# Patient Record
Sex: FEMALE | Race: WHITE | Employment: OTHER | URBAN - METROPOLITAN AREA
[De-identification: names, ages, dates, MRNs, and addresses within clinical notes are randomized per-mention and may not be internally consistent; named-entity substitution may affect disease eponyms.]

---

## 2017-01-05 ENCOUNTER — APPOINTMENT (OUTPATIENT)
Dept: PHYSICAL THERAPY | Facility: HOSPITAL | Age: 67
End: 2017-01-05
Attending: UROLOGY
Payer: COMMERCIAL

## 2017-01-12 ENCOUNTER — OFFICE VISIT (OUTPATIENT)
Dept: PHYSICAL THERAPY | Facility: HOSPITAL | Age: 67
End: 2017-01-12
Attending: UROLOGY
Payer: COMMERCIAL

## 2017-01-12 PROCEDURE — 97140 MANUAL THERAPY 1/> REGIONS: CPT

## 2017-01-12 PROCEDURE — 97112 NEUROMUSCULAR REEDUCATION: CPT

## 2017-01-12 NOTE — PROGRESS NOTES
Dx: Stress incontinence in female (N39.3) Authorized # of Visits: Baron Gill GOMEZ       Next MD visit: none scheduled  Urge incontinence (N39.41)  Overactive bladder (N32.81)           Fall Risk: standard         Precautions: n/a           Medication Changes sinc massage, cross friction, trigger point release, and ischemic release techniques to reduce pain, improve mobility, increase circulation/blood flow, and promote removal of inflammation. Patient reported no pain with palpation at end of session.  Progressed pe

## 2017-01-17 ENCOUNTER — OFFICE VISIT (OUTPATIENT)
Dept: OBGYN CLINIC | Facility: CLINIC | Age: 67
End: 2017-01-17

## 2017-01-17 VITALS
BODY MASS INDEX: 21.89 KG/M2 | HEART RATE: 86 BPM | SYSTOLIC BLOOD PRESSURE: 130 MMHG | WEIGHT: 133 LBS | HEIGHT: 65.5 IN | DIASTOLIC BLOOD PRESSURE: 81 MMHG

## 2017-01-17 DIAGNOSIS — Z01.419 ENCOUNTER FOR GYNECOLOGICAL EXAMINATION WITHOUT ABNORMAL FINDING: Primary | ICD-10-CM

## 2017-01-17 DIAGNOSIS — N94.10 DYSPAREUNIA IN FEMALE: ICD-10-CM

## 2017-01-17 PROCEDURE — 99386 PREV VISIT NEW AGE 40-64: CPT | Performed by: OBSTETRICS & GYNECOLOGY

## 2017-01-17 NOTE — PROGRESS NOTES
Mauricio iVdal is a 61year old female  No LMP recorded. Patient is postmenopausal. Patient presents with:  Gyn Exam: annual -- new patient. severe dyspareunia; going to PT for urine incontinence & they are helping w/ that also.   .    OBSTETRICS HIST • Heart Disease Neg      No Family h/o CAD;  No Family h/o Premature CAD   • Diabetes Neg      No Family h/o Diabetes   • Heart Disorder Neg    • Hypertension Neg    • Lipids Neg    • Cancer Other 28     niece -- cervical cancer       MEDICATIONS:    Curr asymmetry, nipple discharge, nipple retraction or skin changes  Abdomen:  soft, nontender, nondistended, no masses  Skin/Hair: no unusual rashes or bruises  Extremities: no edema, no cyanosis  Psychiatric:  Oriented to time, place, person and situation.  Ap

## 2017-01-19 ENCOUNTER — OFFICE VISIT (OUTPATIENT)
Dept: PHYSICAL THERAPY | Facility: HOSPITAL | Age: 67
End: 2017-01-19
Attending: UROLOGY
Payer: COMMERCIAL

## 2017-01-19 PROCEDURE — 97140 MANUAL THERAPY 1/> REGIONS: CPT

## 2017-01-19 NOTE — PROGRESS NOTES
Patient Name: Syd Ross  YOB: 1950          MRN number:  [id number]  Date:  1/19/17  Referring Physician:  Ladonna Telles  Dx: Stress incontinence in female (N39.3)  Urge incontinence (N39.41)  Overactive bladder (N32.81)    Physic coordination/muscle recruitment with quick contractions. Patient has met all functional goals and has been instructed to continue independently at home. Patient was instructed in and administered updated HEP.      Objective:    GAIT ANALYSIS  WNL    POSTURE supine  12/15: PF activation with walking   12/22: SB  Pelvic floor  1/12: PF L4a  1/19: L5   Therapeutic Exercise    Manual/Internal ROM/strength/function re-assessment    Internal:  STM, MFR, CT stretching, trigger point release: L sup transverse, deep t therapist: Carlyn Man, PT    [de-identified] certification required: Yes  I certify the need for these services furnished under this plan of treatment and while under my care.     X___________________________________________________ Date____________________

## 2017-01-23 ENCOUNTER — OFFICE VISIT (OUTPATIENT)
Dept: DERMATOLOGY CLINIC | Facility: CLINIC | Age: 67
End: 2017-01-23

## 2017-01-23 DIAGNOSIS — D23.60 BENIGN NEOPLASM OF SKIN OF UPPER LIMB, INCLUDING SHOULDER, UNSPECIFIED LATERALITY: ICD-10-CM

## 2017-01-23 DIAGNOSIS — L81.4 LENTIGO: ICD-10-CM

## 2017-01-23 DIAGNOSIS — D23.30 BENIGN NEOPLASM OF SKIN OF FACE: ICD-10-CM

## 2017-01-23 DIAGNOSIS — L56.5 DSAP (DISSEMINATED SUPERFICIAL ACTINIC POROKERATOSIS): ICD-10-CM

## 2017-01-23 DIAGNOSIS — L57.0 LICHENOID ACTINIC KERATOSIS: Primary | ICD-10-CM

## 2017-01-23 DIAGNOSIS — D23.4 BENIGN NEOPLASM OF SCALP AND SKIN OF NECK: ICD-10-CM

## 2017-01-23 DIAGNOSIS — D23.5 BENIGN NEOPLASM OF SKIN OF TRUNK, EXCEPT SCROTUM: ICD-10-CM

## 2017-01-23 PROCEDURE — 17000 DESTRUCT PREMALG LESION: CPT | Performed by: DERMATOLOGY

## 2017-01-23 PROCEDURE — 99203 OFFICE O/P NEW LOW 30 MIN: CPT | Performed by: DERMATOLOGY

## 2017-01-25 ENCOUNTER — TELEPHONE (OUTPATIENT)
Dept: GASTROENTEROLOGY | Facility: CLINIC | Age: 67
End: 2017-01-25

## 2017-01-25 NOTE — TELEPHONE ENCOUNTER
Post colonoscopy ( [facility]) : normal colonoscopy. Moderate tortuosity. Internal hemorrhoids.  GI RN recall colonoscopy for 10 years

## 2017-01-26 ENCOUNTER — APPOINTMENT (OUTPATIENT)
Dept: PHYSICAL THERAPY | Facility: HOSPITAL | Age: 67
End: 2017-01-26
Attending: UROLOGY
Payer: COMMERCIAL

## 2017-02-07 NOTE — PROGRESS NOTES
Dillon Mensah is a 61year old female. HPI:     CC:  Patient presents with:  Derm Problem: LOV: 2/2/2011. Patient presenting with sun damage to body due to living in [place] during the winter and requesting skin check. Patient has family Hx of BCC.   Chandler Lindquist Rfl: 3     Allergies:     Duloxetine Hcl              Comment:Other reaction(s): night sweats, elevated BP    Past Medical History   Diagnosis Date   • Osteoporosis screening 8/21/2013     per NG   • Mood swings (HCC)    • Urinary incontinence      using [facility]. History, medications, allergies reviewed as noted. ROS:  Denies any other systemic complaints. No new or changeing lesions other than noted above. No fevers, chills, night sweats, unusual sun sensitivity. No other skin complaints.         Hi face  Benign neoplasm of scalp and skin of neck    Lichenoid keratosis. Actinic keratoses. Precancerous nature discussed. Lesions treated with cryo- . Biopsy if not resolved. Lesion right medial posterior shoulder probable seborrheic keratosis.   Angie Alvarez

## 2017-03-14 ENCOUNTER — TELEPHONE (OUTPATIENT)
Dept: SURGERY | Facility: CLINIC | Age: 67
End: 2017-03-14

## 2017-03-14 NOTE — TELEPHONE ENCOUNTER
I spoke with pt and informed her that I show a Vesicare 5 mg script on file in her med module and a 1 yr supply was sent to her [facility] in [place] back in 6/2016.  I told her that we cannot send scripts out of state but that I can ask [facility] to refill and w

## 2017-03-14 NOTE — TELEPHONE ENCOUNTER
Pt states [facility] suggested for pt to use vesicare 10mg on 12/28 OV, pt refused, but now would like to use vesicare 10mg tablets, pt [facility] [place] phone number: [phone]. Pls advise thank you.

## 2017-03-15 RX ORDER — SOLIFENACIN SUCCINATE 10 MG/1
10 TABLET, FILM COATED ORAL DAILY
Qty: 30 TABLET | Refills: 11 | Status: SHIPPED | OUTPATIENT
Start: 2017-03-15 | End: 2017-03-20

## 2017-03-16 NOTE — TELEPHONE ENCOUNTER
I signed order for Vesicare 10 mg, and sent it electronically.   Many many thanks, Dr. Lizeth Echeverria

## 2017-03-16 NOTE — TELEPHONE ENCOUNTER
Spoke with patient informed that Dr Saumya Rashid approved Vesicare 10mg and gave refills for a year. States [facility] [place] and have them transfter script from [facility].

## 2017-03-17 ENCOUNTER — TELEPHONE (OUTPATIENT)
Dept: FAMILY MEDICINE CLINIC | Facility: CLINIC | Age: 67
End: 2017-03-17

## 2017-03-17 DIAGNOSIS — F32.A DEPRESSIVE DISORDER: ICD-10-CM

## 2017-03-17 DIAGNOSIS — K21.9 GASTROESOPHAGEAL REFLUX DISEASE, ESOPHAGITIS PRESENCE NOT SPECIFIED: Primary | ICD-10-CM

## 2017-03-17 NOTE — TELEPHONE ENCOUNTER
Pt is calling requesting refill on medication     Current Outpatient Prescriptions:  Sertraline HCl 50 MG Oral Tab Take 1 tablet (50 mg total) by mouth once daily.  Disp: 90 tablet Rfl: 3   omeprazole 20 MG Oral Capsule Delayed Release TAKE ONE CAPSULE BY M

## 2017-03-17 NOTE — TELEPHONE ENCOUNTER
Pharmacy reports that they have attempted to transfer scripts from [facility] in [address] for the following medications, but they are receiving errors when attempting to do this indicating no more refills remaining, however it appears for 2/3 meds that a year supply should be available. Please call [facility] in [place] to help resolve. Current Outpatient Prescriptions:  Sertraline HCl 50 MG Oral Tab Take 1 tablet (50 mg total) by mouth once daily.  Disp: 90 tablet Rfl: 3   omeprazole 20 MG Oral Capsule Delayed Release TAKE ONE CAPSULE BY MOUTH EVERY MORNING Disp: 90 capsule Rfl: 0   Lovastatin 40 MG Oral Tab TAKE 1 TABLET EVERY EVENING WITH FOOD Disp: 90 tablet Rfl: 3

## 2017-03-20 RX ORDER — SOLIFENACIN SUCCINATE 10 MG/1
10 TABLET, FILM COATED ORAL DAILY
Qty: 30 TABLET | Refills: 11 | Status: SHIPPED | OUTPATIENT
Start: 2017-03-20 | End: 2018-03-27

## 2017-03-20 RX ORDER — OMEPRAZOLE 20 MG/1
CAPSULE, DELAYED RELEASE ORAL
Qty: 90 CAPSULE | Refills: 0 | Status: SHIPPED | OUTPATIENT
Start: 2017-03-20 | End: 2018-01-02

## 2017-03-20 NOTE — TELEPHONE ENCOUNTER
Left a detailed  Message Medications have been refilled to local pharmacy and Lovastatin was  on 3/17/17 pr local phrmacy . If need to go to a different [facility] out of state she needs to transfer [address] or [phone].     Refill Pr

## 2017-03-20 NOTE — TELEPHONE ENCOUNTER
Phoned [facility]. [facility] states they do not Rx for Vesicare 10 mg that our office sent electronically on 3/15/17. Re-ordered medication and sent to [facility].  Please see 3/14/17 TE; Medication Question for MD's approval.

## 2017-03-20 NOTE — TELEPHONE ENCOUNTER
[facility] [place] [facility] [place] and they stated prescription has not been received, asking for prescription to be resent.

## 2017-04-05 ENCOUNTER — TELEPHONE (OUTPATIENT)
Dept: FAMILY MEDICINE CLINIC | Facility: CLINIC | Age: 67
End: 2017-04-05

## 2017-04-05 NOTE — TELEPHONE ENCOUNTER
Pt left . Pt is in [facility] and has an bladder infection. Asking for a call back. Please advise.

## 2017-04-05 NOTE — TELEPHONE ENCOUNTER
Actions Requested: Pt is requesting abx    Situation/Background   Problem: The feeling of wanting to urinate but when she goes to the washroom she is not able to go.    Onset: 2 days ago   Associated Symptoms:    History of Same:    Precipitated By:

## 2017-04-06 RX ORDER — CIPROFLOXACIN 500 MG/1
500 TABLET, FILM COATED ORAL 2 TIMES DAILY
Qty: 6 TABLET | Refills: 0 | OUTPATIENT
Start: 2017-04-06 | End: 2017-04-07

## 2017-04-06 NOTE — TELEPHONE ENCOUNTER
TCB, please transfer to [id number] until 430 today or [phone] - need to inform     CIPRO WAS SENT TO [address] [place] [address].  AT [place], [phone], [phone]       Medication Detail

## 2017-04-07 RX ORDER — CIPROFLOXACIN 500 MG/1
500 TABLET, FILM COATED ORAL 2 TIMES DAILY
Qty: 6 TABLET | Refills: 0 | Status: SHIPPED | OUTPATIENT
Start: 2017-04-07 | End: 2017-04-10

## 2017-04-07 NOTE — TELEPHONE ENCOUNTER
Pt was inform that [facility] was [facility] local [facility] and she can get it transfer to [facility] in [place]. Pt agreed. Thanks

## 2017-06-01 ENCOUNTER — PATIENT MESSAGE (OUTPATIENT)
Dept: SURGERY | Facility: CLINIC | Age: 67
End: 2017-06-01

## 2017-06-01 DIAGNOSIS — R35.0 URINARY FREQUENCY: Primary | ICD-10-CM

## 2017-06-05 ENCOUNTER — TELEPHONE (OUTPATIENT)
Dept: SURGERY | Facility: CLINIC | Age: 67
End: 2017-06-05

## 2017-06-05 NOTE — TELEPHONE ENCOUNTER
From: Darwin Morton  To: Dave Palacio MD  Sent: 6/1/2017 8:29 AM CDT  Subject: Non-Urgent Medical Question    I'm noticing a surfer like oder and some cloudiness in my urine recently as well as a frequent need to go but with sometimes very minimal pr

## 2017-06-05 NOTE — TELEPHONE ENCOUNTER
I read pt's my chart enct. About an odor to her urine and cloudy appearance and increase in frequency with small amts of urine production so I decided to call.  I determined that she said the symptoms have subsided today and denied any pain or burning with

## 2017-06-06 NOTE — TELEPHONE ENCOUNTER
Please call patient back.   I agree with urine culture and urinalysis; hopefully submitted that today; patient to call office 3 days later, in the morning for results; if urine culture positive for infection, we will electronically send appropriate antibiot

## 2017-06-06 NOTE — TELEPHONE ENCOUNTER
Noted. I spoke with pt and informed her of ALEXANDRA's instructions below and she will go to the lab today and call the office 2 days later.

## 2017-10-05 ENCOUNTER — NURSE TRIAGE (OUTPATIENT)
Dept: OTHER | Age: 67
End: 2017-10-05

## 2017-10-05 ENCOUNTER — OFFICE VISIT (OUTPATIENT)
Dept: FAMILY MEDICINE CLINIC | Facility: CLINIC | Age: 67
End: 2017-10-05

## 2017-10-05 ENCOUNTER — LAB ENCOUNTER (OUTPATIENT)
Dept: LAB | Age: 67
End: 2017-10-05
Attending: FAMILY MEDICINE
Payer: COMMERCIAL

## 2017-10-05 VITALS
BODY MASS INDEX: 22.09 KG/M2 | HEART RATE: 67 BPM | HEIGHT: 65.5 IN | TEMPERATURE: 98 F | DIASTOLIC BLOOD PRESSURE: 89 MMHG | SYSTOLIC BLOOD PRESSURE: 138 MMHG | WEIGHT: 134.19 LBS

## 2017-10-05 DIAGNOSIS — R10.13 EPIGASTRIC ABDOMINAL PAIN: ICD-10-CM

## 2017-10-05 DIAGNOSIS — R19.7 DIARRHEA, UNSPECIFIED TYPE: ICD-10-CM

## 2017-10-05 DIAGNOSIS — R10.13 EPIGASTRIC ABDOMINAL PAIN: Primary | ICD-10-CM

## 2017-10-05 PROCEDURE — 80053 COMPREHEN METABOLIC PANEL: CPT

## 2017-10-05 PROCEDURE — 85652 RBC SED RATE AUTOMATED: CPT

## 2017-10-05 PROCEDURE — 99212 OFFICE O/P EST SF 10 MIN: CPT | Performed by: FAMILY MEDICINE

## 2017-10-05 PROCEDURE — 36415 COLL VENOUS BLD VENIPUNCTURE: CPT

## 2017-10-05 PROCEDURE — 85025 COMPLETE CBC W/AUTO DIFF WBC: CPT

## 2017-10-05 PROCEDURE — 99214 OFFICE O/P EST MOD 30 MIN: CPT | Performed by: FAMILY MEDICINE

## 2017-10-05 NOTE — TELEPHONE ENCOUNTER
Action Requested: Summary for Provider     []  Critical Lab, Recommendations Needed  [] Need Additional Advice  []   FYI    []   Need Orders  [] Need Medications Sent to Pharmacy  [x]  Other     SUMMARY: Patient requests appointment today with Dr. Melissa Chávez.

## 2017-10-05 NOTE — PROGRESS NOTES
Patient ID: Jamar Sargent is a 61year old female.     HPI  Patient presents with:  Abdominal Pain    Action Requested: Summary for Provider     []  Critical Lab, Recommendations Needed  [] Need Additional Advice  []   FYI    []   Need Orders  [] Need Medi but she has been doing so well that she has not been on it for some time.       Wt Readings from Last 6 Encounters:  10/05/17 : 134 lb 3.2 oz (60.9 kg)  01/17/17 : 133 lb (60.3 kg)  12/30/16 : 133 lb (60.3 kg)  12/28/16 : 130 lb (59 kg)  05/04/16 : 130 lb ( rhythm and normal heart sounds. Pulmonary/Chest: Effort normal and breath sounds normal. No respiratory distress.    Abdominal: Normal appearance and bowel sounds are normal. There is minimal tenderness in the epigastric area only  There is no rigidity,

## 2017-11-01 ENCOUNTER — IMMUNIZATION (OUTPATIENT)
Dept: FAMILY MEDICINE CLINIC | Facility: CLINIC | Age: 67
End: 2017-11-01

## 2017-11-01 DIAGNOSIS — Z23 NEED FOR VACCINATION: ICD-10-CM

## 2017-11-01 PROCEDURE — 90471 IMMUNIZATION ADMIN: CPT | Performed by: FAMILY MEDICINE

## 2017-11-01 PROCEDURE — 90686 IIV4 VACC NO PRSV 0.5 ML IM: CPT | Performed by: FAMILY MEDICINE

## 2017-12-18 ENCOUNTER — OFFICE VISIT (OUTPATIENT)
Dept: FAMILY MEDICINE CLINIC | Facility: CLINIC | Age: 67
End: 2017-12-18

## 2017-12-18 ENCOUNTER — HOSPITAL ENCOUNTER (OUTPATIENT)
Dept: GENERAL RADIOLOGY | Age: 67
Discharge: HOME | End: 2017-12-18
Attending: FAMILY MEDICINE
Payer: MEDICARE

## 2017-12-18 VITALS
HEIGHT: 65.5 IN | DIASTOLIC BLOOD PRESSURE: 82 MMHG | SYSTOLIC BLOOD PRESSURE: 127 MMHG | TEMPERATURE: 98 F | HEART RATE: 75 BPM | RESPIRATION RATE: 12 BRPM | WEIGHT: 138 LBS | BODY MASS INDEX: 22.72 KG/M2

## 2017-12-18 DIAGNOSIS — G89.29 CHRONIC PAIN OF LEFT KNEE: Primary | ICD-10-CM

## 2017-12-18 DIAGNOSIS — M25.562 CHRONIC PAIN OF LEFT KNEE: ICD-10-CM

## 2017-12-18 DIAGNOSIS — M25.562 CHRONIC PAIN OF LEFT KNEE: Primary | ICD-10-CM

## 2017-12-18 DIAGNOSIS — G89.29 CHRONIC PAIN OF LEFT KNEE: ICD-10-CM

## 2017-12-18 PROCEDURE — 99212 OFFICE O/P EST SF 10 MIN: CPT | Performed by: FAMILY MEDICINE

## 2017-12-18 PROCEDURE — 99214 OFFICE O/P EST MOD 30 MIN: CPT | Performed by: FAMILY MEDICINE

## 2017-12-18 PROCEDURE — 73564 X-RAY EXAM KNEE 4 OR MORE: CPT | Performed by: FAMILY MEDICINE

## 2017-12-18 NOTE — PATIENT INSTRUCTIONS
Lookup patellofemoral syndrome exercises on the Internet or even on YouTube. You want to strengthen your VMO muscle in your quadricep which is the vastus  medialis oblique muscle.     When you see your [facility] representative, ask them if you have

## 2017-12-18 NOTE — PROGRESS NOTES
Patient ID: Rosa Nix is a 61year old female.     HPI  Patient presents with:  Pain: left knee, sharp pain when going up stairs    She states she does go to [employer] and works out quite a bit and she states for some years she has had left knee pa        Current Outpatient Prescriptions:  Solifenacin Succinate (VESICARE) 10 MG Oral Tab Take 1 tablet (10 mg total) by mouth daily.  Disp: 30 tablet Rfl: 11   omeprazole 20 MG Oral Capsule Delayed Release TAKE ONE CAPSULE BY MOUTH EVERY MORNING Dis VIEWS), LEFT ([id number]); Future  Patient Instructions   Lookup patellofemoral syndrome exercises on the Internet or even on YouTube. You want to strengthen your VMO muscle in your quadricep which is the vastus  medialis oblique muscle.     When you see yo

## 2018-01-02 DIAGNOSIS — K21.9 GASTROESOPHAGEAL REFLUX DISEASE, ESOPHAGITIS PRESENCE NOT SPECIFIED: ICD-10-CM

## 2018-01-03 RX ORDER — OMEPRAZOLE 20 MG/1
CAPSULE, DELAYED RELEASE ORAL
Qty: 90 CAPSULE | Refills: 0 | Status: SHIPPED | OUTPATIENT
Start: 2018-01-03 | End: 2018-04-29

## 2018-01-03 NOTE — TELEPHONE ENCOUNTER
Medication refilled for 90 days per protocol.     Refill Protocol Appointment Criteria  · Appointment scheduled in the past 12 months or in the next 3 months  Recent Outpatient Visits            2 weeks ago Chronic pain of left knee    Adelina Newman

## 2018-01-13 DIAGNOSIS — F32.A DEPRESSIVE DISORDER: ICD-10-CM

## 2018-01-17 NOTE — TELEPHONE ENCOUNTER
Refill Protocol Appointment Criteria  · Appointment scheduled in the past 6 months or in the next 3 months  Recent Outpatient Visits            1 month ago Chronic pain of left knee    [address] [facility], [address], [place], [address] [facility].

## 2018-01-23 DIAGNOSIS — E78.2 MIXED HYPERLIPIDEMIA: ICD-10-CM

## 2018-01-26 RX ORDER — LOVASTATIN 40 MG/1
TABLET ORAL
Qty: 90 TABLET | Refills: 0 | Status: SHIPPED | OUTPATIENT
Start: 2018-01-26 | End: 2018-07-16

## 2018-01-26 NOTE — TELEPHONE ENCOUNTER
Refill protocol failed because the patient did not meet the protocol criteria.  Please advise in regards to refill request       Cholesterol Medications  Protocol Criteria:  · Appointment scheduled in the past 12 months or in the next 3 months  · ALT & LDL

## 2018-03-29 NOTE — TELEPHONE ENCOUNTER
Dr. Hoang Iyer, pt [address] [place] [address] 12/28/16 pt did make rebekah with you 5/1/18 pt pharmacy requesting refill on vesicare if you agree please review and sign med, thank you. I copied and pasted part of your last note below. 1.    Continue Vesicare 5 mg daily     2.   Unt

## 2018-03-31 RX ORDER — SOLIFENACIN SUCCINATE 10 MG/1
TABLET, FILM COATED ORAL
Qty: 30 TABLET | Refills: 11 | Status: SHIPPED | OUTPATIENT
Start: 2018-03-31 | End: 2018-05-28

## 2018-04-29 DIAGNOSIS — K21.9 GASTROESOPHAGEAL REFLUX DISEASE, ESOPHAGITIS PRESENCE NOT SPECIFIED: ICD-10-CM

## 2018-04-29 DIAGNOSIS — F32.A DEPRESSIVE DISORDER: ICD-10-CM

## 2018-05-01 ENCOUNTER — APPOINTMENT (OUTPATIENT)
Dept: LAB | Facility: HOSPITAL | Age: 68
End: 2018-05-01
Attending: UROLOGY
Payer: MEDICARE

## 2018-05-01 ENCOUNTER — OFFICE VISIT (OUTPATIENT)
Dept: SURGERY | Facility: CLINIC | Age: 68
End: 2018-05-01

## 2018-05-01 VITALS
TEMPERATURE: 98 F | WEIGHT: 139 LBS | DIASTOLIC BLOOD PRESSURE: 84 MMHG | SYSTOLIC BLOOD PRESSURE: 133 MMHG | HEART RATE: 71 BPM | HEIGHT: 65 IN | BODY MASS INDEX: 23.16 KG/M2

## 2018-05-01 DIAGNOSIS — N39.3 STRESS INCONTINENCE: ICD-10-CM

## 2018-05-01 DIAGNOSIS — N32.81 OVERACTIVE BLADDER: Primary | ICD-10-CM

## 2018-05-01 DIAGNOSIS — N39.41 URGE INCONTINENCE: ICD-10-CM

## 2018-05-01 PROCEDURE — 99215 OFFICE O/P EST HI 40 MIN: CPT | Performed by: UROLOGY

## 2018-05-01 PROCEDURE — G0463 HOSPITAL OUTPT CLINIC VISIT: HCPCS | Performed by: UROLOGY

## 2018-05-01 PROCEDURE — 87086 URINE CULTURE/COLONY COUNT: CPT | Performed by: UROLOGY

## 2018-05-01 PROCEDURE — 81001 URINALYSIS AUTO W/SCOPE: CPT | Performed by: UROLOGY

## 2018-05-01 RX ORDER — OMEPRAZOLE 20 MG/1
CAPSULE, DELAYED RELEASE ORAL
Qty: 90 CAPSULE | Refills: 0 | Status: SHIPPED | OUTPATIENT
Start: 2018-05-01 | End: 2018-08-25

## 2018-05-01 NOTE — PROGRESS NOTES
HPI:    Patient ID: Ashley Sandhu is a 74year old female. HPI    1. Incontinence  Onset: when she was a teenager. The patient complains of both stress and urge incontinence and feels the stress incontinence is worse.   Severity: she leaks approximately position 2-3 drops, leakage while coughing in the standing position was a small squirt or stream. 8/19/16 Patient reports she is taking oxybutynin which was restarted last visit and reports significant improvement.  8/19/2016 Complex CMG performed -- stream repair  No date: OTHER SURGICAL HISTORY      Comment: bilateral lasik surgery    Family History   Problem Relation Age of Onset   • Cancer Father 80     Cancer - bladder   • Cancer Brother      Cancer - throat   • Cancer Other 28     niece -- cervic Pulse: 71   Temp: 97.6 °F (36.4 °C)   TempSrc: Oral   Weight: 139 lb (63 kg)   Height: 5' 5\" (1.651 m)         Body mass index is 23.13 kg/m².      I spent 40 minutes with patient, and majority of this time was spent discussing treatment options, answeri August 2018 with UA before the visit.     I explained to patient the benefits, risks, complications, side effects, reasons for, nature of, alternatives to the above treatment options listed above, and I answered questions concerning them; patient understand 5/1/2018, 5:45 PM

## 2018-05-01 NOTE — TELEPHONE ENCOUNTER
Gastrointestional Medication:    Refill Protocol Appointment Criteria  · Appointment scheduled in the past 12 months or in the next 3 months  Recent Outpatient Visits            4 months ago Chronic pain of left knee    [address], [employer]

## 2018-05-29 NOTE — TELEPHONE ENCOUNTER
Dr. Marion Cervantes, pt [address] [facility] 5/1/18 pt pharmacy requesting refill on vesicare if you agree please review and sign med, thank you. I copied and pasted part of your last note below.     3.  Continue Vesicare 10 mg daily     4.   pelvic floor physical therapy consult

## 2018-05-31 RX ORDER — SOLIFENACIN SUCCINATE 10 MG/1
TABLET, FILM COATED ORAL
Qty: 30 TABLET | Refills: 11 | Status: SHIPPED | OUTPATIENT
Start: 2018-05-31 | End: 2018-06-18

## 2018-05-31 NOTE — TELEPHONE ENCOUNTER
Dr. Maureen Ordaz, pt [address] [facility] 5/1/18 pt requesting refill on vesicare if you agree please review and sign med, I copied and pasted part of your last note below. 1.  Urine specimen today for complete urinalysis and urine culture     2.   Intake voiding diary for

## 2018-05-31 NOTE — TELEPHONE ENCOUNTER
From: Domenico Ga  Sent: 5/31/2018 10:46 AM CDT  Subject: Medication Renewal Request    Domenico Ga would like a refill of the following medications:     VESICARE 10 MG Oral Tab [Zee Lopez MD]   Patient Comment: Need a new RX for this medicati

## 2018-06-01 RX ORDER — SOLIFENACIN SUCCINATE 10 MG/1
10 TABLET, FILM COATED ORAL
Qty: 30 TABLET | Refills: 11 | Status: SHIPPED
Start: 2018-06-01 | End: 2018-10-09

## 2018-06-14 ENCOUNTER — HOSPITAL ENCOUNTER (OUTPATIENT)
Dept: GENERAL RADIOLOGY | Age: 68
Discharge: HOME | End: 2018-06-14
Attending: FAMILY MEDICINE | Admitting: FAMILY MEDICINE
Payer: MEDICARE

## 2018-06-14 ENCOUNTER — LAB ENCOUNTER (OUTPATIENT)
Dept: LAB | Age: 68
End: 2018-06-14
Attending: FAMILY MEDICINE
Payer: MEDICARE

## 2018-06-14 ENCOUNTER — APPOINTMENT (OUTPATIENT)
Dept: LAB | Age: 68
End: 2018-06-14
Attending: FAMILY MEDICINE
Payer: MEDICARE

## 2018-06-14 ENCOUNTER — OFFICE VISIT (OUTPATIENT)
Dept: FAMILY MEDICINE CLINIC | Facility: CLINIC | Age: 68
End: 2018-06-14

## 2018-06-14 VITALS
WEIGHT: 141 LBS | SYSTOLIC BLOOD PRESSURE: 150 MMHG | HEART RATE: 70 BPM | HEIGHT: 65 IN | TEMPERATURE: 98 F | BODY MASS INDEX: 23.49 KG/M2 | DIASTOLIC BLOOD PRESSURE: 98 MMHG

## 2018-06-14 DIAGNOSIS — R03.0 ELEVATED BLOOD PRESSURE READING: ICD-10-CM

## 2018-06-14 DIAGNOSIS — N32.81 OAB (OVERACTIVE BLADDER): ICD-10-CM

## 2018-06-14 DIAGNOSIS — Z11.4 SCREENING FOR HIV (HUMAN IMMUNODEFICIENCY VIRUS): ICD-10-CM

## 2018-06-14 DIAGNOSIS — F32.A DEPRESSIVE DISORDER: ICD-10-CM

## 2018-06-14 DIAGNOSIS — M85.80 OSTEOPENIA, UNSPECIFIED LOCATION: ICD-10-CM

## 2018-06-14 DIAGNOSIS — Z12.31 VISIT FOR SCREENING MAMMOGRAM: ICD-10-CM

## 2018-06-14 DIAGNOSIS — Z23 NEED FOR VACCINATION: ICD-10-CM

## 2018-06-14 DIAGNOSIS — E55.9 VITAMIN D DEFICIENCY: ICD-10-CM

## 2018-06-14 DIAGNOSIS — Z11.59 NEED FOR HEPATITIS C SCREENING TEST: ICD-10-CM

## 2018-06-14 DIAGNOSIS — E78.2 MIXED HYPERLIPIDEMIA: ICD-10-CM

## 2018-06-14 DIAGNOSIS — Z00.00 ADULT GENERAL MEDICAL EXAM: Primary | ICD-10-CM

## 2018-06-14 DIAGNOSIS — Z00.00 ENCOUNTER FOR ANNUAL HEALTH EXAMINATION: ICD-10-CM

## 2018-06-14 DIAGNOSIS — Z78.0 POSTMENOPAUSAL: ICD-10-CM

## 2018-06-14 DIAGNOSIS — J30.1 SEASONAL ALLERGIC RHINITIS DUE TO POLLEN: ICD-10-CM

## 2018-06-14 DIAGNOSIS — K21.9 GASTROESOPHAGEAL REFLUX DISEASE, ESOPHAGITIS PRESENCE NOT SPECIFIED: ICD-10-CM

## 2018-06-14 PROCEDURE — 86803 HEPATITIS C AB TEST: CPT

## 2018-06-14 PROCEDURE — 80053 COMPREHEN METABOLIC PANEL: CPT

## 2018-06-14 PROCEDURE — 93005 ELECTROCARDIOGRAM TRACING: CPT

## 2018-06-14 PROCEDURE — G0009 ADMIN PNEUMOCOCCAL VACCINE: HCPCS | Performed by: FAMILY MEDICINE

## 2018-06-14 PROCEDURE — 93010 ELECTROCARDIOGRAM REPORT: CPT | Performed by: FAMILY MEDICINE

## 2018-06-14 PROCEDURE — G0402 INITIAL PREVENTIVE EXAM: HCPCS | Performed by: FAMILY MEDICINE

## 2018-06-14 PROCEDURE — 84443 ASSAY THYROID STIM HORMONE: CPT

## 2018-06-14 PROCEDURE — 81001 URINALYSIS AUTO W/SCOPE: CPT

## 2018-06-14 PROCEDURE — 71046 X-RAY EXAM CHEST 2 VIEWS: CPT | Performed by: FAMILY MEDICINE

## 2018-06-14 PROCEDURE — 87389 HIV-1 AG W/HIV-1&-2 AB AG IA: CPT

## 2018-06-14 PROCEDURE — 80061 LIPID PANEL: CPT

## 2018-06-14 PROCEDURE — 85025 COMPLETE CBC W/AUTO DIFF WBC: CPT

## 2018-06-14 PROCEDURE — 36415 COLL VENOUS BLD VENIPUNCTURE: CPT

## 2018-06-14 PROCEDURE — 82306 VITAMIN D 25 HYDROXY: CPT

## 2018-06-14 PROCEDURE — 90670 PCV13 VACCINE IM: CPT | Performed by: FAMILY MEDICINE

## 2018-06-14 NOTE — PATIENT INSTRUCTIONS
Kateryna Eli's SCREENING SCHEDULE   Tests on this list are recommended by your physician but may not be covered, or covered at this frequency, by your insurer. Please check with your insurance carrier before scheduling to verify coverage.    PREVENTATIVE Colorectal Cancer Screening  Covered up to Age 76     Colonoscopy Screen   Covered every 10 years- more often if abnormal Colonoscopy,10 Years due on 12/20/1950  Colonoscopy,10 Years due on 01/25/2027 Update Health Maintenance if applicable    Flex Sigmo year    Pneumococcal 13 (Prevnar)  Covered Once after 65   Orders placed or performed in visit on 06/14/18  -PNEUMOCOCCAL VACC, 13 TAMIE IM    Please get once after your 65th birthday    Pneumococcal 23 (Pneumovax)  Covered Once after 65 No orders found for

## 2018-06-14 NOTE — PROGRESS NOTES
HPI:   Domenico Ga is a 74year old female who presents for a Medicare Initial Preventative Physical Exam (Welcome to Medicare- < 12 months on Medicare). She is up-to-date on her colonoscopy by Dr. En Dent.     She is does not smoke, she is , she i difficulties based on screening of functional status. She has Eating difficulties based on screening of functional status. She has Driving difficulties based on screening of functional status.        She has Meal Preparation difficulties based on : 141 lb (64 kg)  05/01/18 : 139 lb (63 kg)  12/18/17 : 138 lb (62.6 kg)     Last Cholesterol Labs:     Lab Results  Component Value Date   CHOLEST 188 12/30/2016   HDL 75 12/30/2016   LDL 98 12/30/2016   TRIG 76 12/30/2016          Last Chemistry Labs: in the urine. She has had some blood pressures recently that were very good so she surprised today that it is high. She does live right across from a [facility] so she will go and get this checked periodically.   She has been sneezing slightly more than us [employer]                           Physical Exam     Vaccination History     Immunization History   Administered Date(s) Administered   • 6-35 MON FLUZONE QUAD PRESERVE FREE SINGLE DOSE (79576) FLU CLINIC 12/01/2015   • Flu Vacc 4 TAMIE Split Virus exercises  Elevated blood pressure reading  -     XR CHEST PA + LAT CHEST (CPT=15001); Future  -     CBC WITH DIFFERENTIAL WITH PLATELET; Future  -     COMP METABOLIC PANEL (14);  Future  -     URINALYSIS WITH CULTURE REFLEX; Future    Encounter for annual Colonoscopy,10 Years due on 12/20/1950  Colonoscopy,10 Years due on 01/25/2027 Update Health Maintenance if applicable    Flex Sigmoidoscopy Screen every 10 years No results found for this or any previous visit. No flowsheet data found.      Fecal Occult Bl does not cover unless Medically needed    Zoster   Not covered by Medicare Part B No vaccine history found This may be covered with your pharmacy  prescription benefits                    Template: YSABEL RICHARDSON MEDICARE ANNUAL ASSESSMENT FEMALE Malini Maze

## 2018-06-18 ENCOUNTER — OFFICE VISIT (OUTPATIENT)
Dept: DERMATOLOGY CLINIC | Facility: CLINIC | Age: 68
End: 2018-06-18

## 2018-06-18 DIAGNOSIS — D23.30 BENIGN NEOPLASM OF SKIN OF FACE: ICD-10-CM

## 2018-06-18 DIAGNOSIS — D23.70 BENIGN NEOPLASM OF SKIN OF LOWER LIMB, INCLUDING HIP, UNSPECIFIED LATERALITY: ICD-10-CM

## 2018-06-18 DIAGNOSIS — D23.4 BENIGN NEOPLASM OF SCALP AND SKIN OF NECK: ICD-10-CM

## 2018-06-18 DIAGNOSIS — L82.1 SEBORRHEIC KERATOSES: ICD-10-CM

## 2018-06-18 DIAGNOSIS — D23.5 BENIGN NEOPLASM OF SKIN OF TRUNK, EXCEPT SCROTUM: ICD-10-CM

## 2018-06-18 DIAGNOSIS — L70.0 ACNE VULGARIS: Primary | ICD-10-CM

## 2018-06-18 DIAGNOSIS — L81.4 LENTIGO: ICD-10-CM

## 2018-06-18 DIAGNOSIS — D23.60 BENIGN NEOPLASM OF SKIN OF UPPER LIMB, INCLUDING SHOULDER, UNSPECIFIED LATERALITY: ICD-10-CM

## 2018-06-18 DIAGNOSIS — L56.5 DSAP (DISSEMINATED SUPERFICIAL ACTINIC POROKERATOSIS): ICD-10-CM

## 2018-06-18 PROCEDURE — 99213 OFFICE O/P EST LOW 20 MIN: CPT | Performed by: DERMATOLOGY

## 2018-06-18 PROCEDURE — 17000 DESTRUCT PREMALG LESION: CPT | Performed by: DERMATOLOGY

## 2018-06-27 ENCOUNTER — HOSPITAL ENCOUNTER (OUTPATIENT)
Dept: BONE DENSITY | Age: 68
Discharge: HOME | End: 2018-06-27
Attending: FAMILY MEDICINE
Payer: MEDICARE

## 2018-06-27 ENCOUNTER — PATIENT MESSAGE (OUTPATIENT)
Dept: OBGYN CLINIC | Facility: CLINIC | Age: 68
End: 2018-06-27

## 2018-06-27 ENCOUNTER — HOSPITAL ENCOUNTER (OUTPATIENT)
Dept: MAMMOGRAPHY | Age: 68
Discharge: HOME | End: 2018-06-27
Attending: FAMILY MEDICINE
Payer: MEDICARE

## 2018-06-27 DIAGNOSIS — Z78.0 POSTMENOPAUSAL: ICD-10-CM

## 2018-06-27 DIAGNOSIS — Z12.31 VISIT FOR SCREENING MAMMOGRAM: ICD-10-CM

## 2018-06-27 PROCEDURE — 77067 SCR MAMMO BI INCL CAD: CPT | Performed by: FAMILY MEDICINE

## 2018-06-27 PROCEDURE — 77080 DXA BONE DENSITY AXIAL: CPT | Performed by: FAMILY MEDICINE

## 2018-06-27 PROCEDURE — 77063 BREAST TOMOSYNTHESIS BI: CPT | Performed by: FAMILY MEDICINE

## 2018-06-27 NOTE — TELEPHONE ENCOUNTER
From: Libertad Mobley  To: Michelle Alonso MD  Sent: 6/27/2018 1:54 PM CDT  Subject: Non-Urgent Medical Question    I think I’m overdue for a Pap test and need to schedule an appointment. Do you have availability during the 2nd week of July?

## 2018-06-30 ENCOUNTER — TELEPHONE (OUTPATIENT)
Dept: OTHER | Age: 68
End: 2018-06-30

## 2018-06-30 NOTE — TELEPHONE ENCOUNTER
----- Message from Keri Thao DO sent at 6/27/2018  5:25 PM CDT -----  Your bone density scan now shows quite severe osteopenia. Your bone mineral density has decreased since the last DEXA scan in 2013.   I think we should put you on a medication to he

## 2018-06-30 NOTE — PROGRESS NOTES
Bert Calderon is a 74year old female. HPI:     CC:  Patient presents with:  Full Skin Exam: LOV 1/23/2017. Pt presenting for full body skin exam. Pt has a family hx of BCC (sisters).         Allergies:  Duloxetine Hcl    HISTORY:    Past Medical History: Medical History:   Diagnosis Date   • Hyperlipidemia    • Mood swings    • Osteoporosis screening 8/21/2013    per NG   • Urinary incontinence     using Physical therapy     Past Surgical History:  1/13/2007: COLONOSCOPY      Comment: supa BLACKBURN  1997: HERNIA above. No fevers, chills, night sweats, unusual sun sensitivity. No other skin complaints. History, medications, allergies reviewed as noted. Physical Examination:     Well-developed well-nourished patient alert oriented in no acute distress. lichenoid keratosis. Forearm right stable. Multiple lentigines bilateral arms. New Erythematous scaling keratotic papules central chest.  Actinic keratoses. Precancerous nature discussed. Lesions treated with cryo- . Biopsy if not resolved.     Muna

## 2018-07-02 NOTE — TELEPHONE ENCOUNTER
Pt is aware see other encounters. Thanks  Viewed by Abdiel Melissa on 6/30/2018  7:33 AM   Written by Mariza Bustillos DO on 6/27/2018  5:25 PM   Your bone density scan now shows quite severe osteopenia.  Your bone mineral density has decreased since the last

## 2018-07-05 ENCOUNTER — TELEPHONE (OUTPATIENT)
Dept: OTHER | Age: 68
End: 2018-07-05

## 2018-07-05 RX ORDER — IBANDRONATE SODIUM 150 MG/1
150 TABLET, FILM COATED ORAL
Qty: 4 TABLET | Refills: 3 | Status: SHIPPED | OUTPATIENT
Start: 2018-07-05 | End: 2018-07-14

## 2018-07-05 NOTE — TELEPHONE ENCOUNTER
Let her know I sent in [facility] instead. She will take this once monthly.

## 2018-07-05 NOTE — TELEPHONE ENCOUNTER
Received fax from pharmacy stating Risendronate (Actonel) not covered by pt's insurance, alternative medications Alendronate or Ibandronate. Please advise.

## 2018-07-06 NOTE — TELEPHONE ENCOUNTER
Spoke with patient and informed her rx for Baljinder Rose was sent to the pharmacy and that she should take this once a month. Patient voiced understanding and agreed with the plan of care.

## 2018-07-14 ENCOUNTER — OFFICE VISIT (OUTPATIENT)
Dept: OBGYN CLINIC | Facility: CLINIC | Age: 68
End: 2018-07-14

## 2018-07-14 VITALS
HEART RATE: 65 BPM | DIASTOLIC BLOOD PRESSURE: 89 MMHG | WEIGHT: 142 LBS | BODY MASS INDEX: 23.66 KG/M2 | SYSTOLIC BLOOD PRESSURE: 149 MMHG | HEIGHT: 65 IN

## 2018-07-14 DIAGNOSIS — F32.A DEPRESSIVE DISORDER: ICD-10-CM

## 2018-07-14 DIAGNOSIS — E78.2 MIXED HYPERLIPIDEMIA: ICD-10-CM

## 2018-07-14 DIAGNOSIS — Z12.4 CERVICAL CANCER SCREENING: ICD-10-CM

## 2018-07-14 DIAGNOSIS — Z01.419 ENCOUNTER FOR GYNECOLOGICAL EXAMINATION WITHOUT ABNORMAL FINDING: Primary | ICD-10-CM

## 2018-07-14 PROCEDURE — G0101 CA SCREEN;PELVIC/BREAST EXAM: HCPCS | Performed by: OBSTETRICS & GYNECOLOGY

## 2018-07-14 NOTE — PROGRESS NOTES
Well Woman Exam    HPI:  The patient is a 72yo female who presents for annual exam. She has no complaints. She is not taking E-ring but still has some dyspareunia. It is much improved with lubrication.  She has done pelvic PT in the past for urinary inconti Onset   • Cancer Father 80     Cancer - bladder   • Cancer Brother      Cancer - throat   • Cancer Other 28     niece -- cervical cancer   • Heart Disease Neg      No Family h/o CAD;  No Family h/o Premature CAD   • Diabetes Neg      No Family h/o Diabetes nontender, nondistended, no masses  Skin/Hair: no unusual rashes or bruises  Extremities: no edema, no cyanosis  Psychiatric: Appropriate mood and affect    Pelvic Exam:  External Genitalia: normal appearance, hair distribution, and no lesions  Urethral Me

## 2018-07-16 DIAGNOSIS — F32.A DEPRESSIVE DISORDER: ICD-10-CM

## 2018-07-16 DIAGNOSIS — E78.2 MIXED HYPERLIPIDEMIA: ICD-10-CM

## 2018-07-16 LAB — HPV I/H RISK 1 DNA SPEC QL NAA+PROBE: NEGATIVE

## 2018-07-16 RX ORDER — LOVASTATIN 40 MG/1
TABLET ORAL
Qty: 90 TABLET | Refills: 0 | OUTPATIENT
Start: 2018-07-16

## 2018-07-16 RX ORDER — LOVASTATIN 40 MG/1
TABLET ORAL
Qty: 90 TABLET | Refills: 0 | Status: SHIPPED | OUTPATIENT
Start: 2018-07-16 | End: 2018-10-12

## 2018-08-25 DIAGNOSIS — K21.9 GASTROESOPHAGEAL REFLUX DISEASE, ESOPHAGITIS PRESENCE NOT SPECIFIED: ICD-10-CM

## 2018-08-27 RX ORDER — OMEPRAZOLE 20 MG/1
CAPSULE, DELAYED RELEASE ORAL
Qty: 90 CAPSULE | Refills: 0 | Status: SHIPPED | OUTPATIENT
Start: 2018-08-27 | End: 2018-11-08

## 2018-08-27 NOTE — TELEPHONE ENCOUNTER
OMEPRAZOLE Medication refilled for 90 days per protocol.   Refill Protocol Appointment Criteria  · Appointment scheduled in the past 12 months or in the next 3 months  Recent Outpatient Visits            1 month ago Encounter for gynecological examination w

## 2018-10-08 ENCOUNTER — APPOINTMENT (OUTPATIENT)
Dept: LAB | Facility: HOSPITAL | Age: 68
End: 2018-10-08
Attending: UROLOGY
Payer: MEDICARE

## 2018-10-08 PROCEDURE — 81001 URINALYSIS AUTO W/SCOPE: CPT | Performed by: UROLOGY

## 2018-10-09 ENCOUNTER — TELEPHONE (OUTPATIENT)
Dept: SURGERY | Facility: CLINIC | Age: 68
End: 2018-10-09

## 2018-10-09 ENCOUNTER — OFFICE VISIT (OUTPATIENT)
Dept: SURGERY | Facility: CLINIC | Age: 68
End: 2018-10-09
Payer: MEDICARE

## 2018-10-09 VITALS
WEIGHT: 139 LBS | HEIGHT: 65 IN | TEMPERATURE: 98 F | SYSTOLIC BLOOD PRESSURE: 142 MMHG | DIASTOLIC BLOOD PRESSURE: 70 MMHG | BODY MASS INDEX: 23.16 KG/M2

## 2018-10-09 DIAGNOSIS — N32.81 OVERACTIVE BLADDER: Primary | ICD-10-CM

## 2018-10-09 DIAGNOSIS — N39.3 STRESS INCONTINENCE: ICD-10-CM

## 2018-10-09 DIAGNOSIS — N39.41 URGE INCONTINENCE: ICD-10-CM

## 2018-10-09 PROCEDURE — 99212 OFFICE O/P EST SF 10 MIN: CPT | Performed by: NURSE PRACTITIONER

## 2018-10-09 PROCEDURE — G0463 HOSPITAL OUTPT CLINIC VISIT: HCPCS | Performed by: NURSE PRACTITIONER

## 2018-10-09 RX ORDER — SOLIFENACIN SUCCINATE 10 MG/1
10 TABLET, FILM COATED ORAL DAILY
Qty: 90 TABLET | Refills: 3 | Status: SHIPPED | OUTPATIENT
Start: 2018-10-09 | End: 2018-10-15

## 2018-10-09 NOTE — PROGRESS NOTES
HPI:    Patient ID: Chelly Manning is a 74year old female. Patient is a 74year old female who presents to the clinic today for follow up regarding incontinece. Patient last office visit with Dr. Jeremie Gonzalez and was prescribed Vesicare.   She was also rec to person, place, and time. Skin: Skin is warm and dry. Psychiatric: She has a normal mood and affect. ASSESSMENT/PLAN:   No diagnosis found.      Patient presents to the clinic for follow up regarding overactive bladder and mixed incontine

## 2018-10-12 DIAGNOSIS — E78.2 MIXED HYPERLIPIDEMIA: ICD-10-CM

## 2018-10-12 DIAGNOSIS — F32.A DEPRESSIVE DISORDER: ICD-10-CM

## 2018-10-13 RX ORDER — LOVASTATIN 40 MG/1
TABLET ORAL
Qty: 90 TABLET | Refills: 0 | Status: SHIPPED | OUTPATIENT
Start: 2018-10-13 | End: 2019-02-03

## 2018-10-13 NOTE — TELEPHONE ENCOUNTER
Sertraline passed per protocol. Re routed to Dr Xochitl Fitch, was wrong sent to Dr Margarita Moy. Please review; protocol failed.

## 2018-10-15 DIAGNOSIS — N32.81 OAB (OVERACTIVE BLADDER): Primary | ICD-10-CM

## 2018-10-15 RX ORDER — OXYBUTYNIN CHLORIDE 10 MG/1
10 TABLET, EXTENDED RELEASE ORAL DAILY
Qty: 90 TABLET | Refills: 3 | Status: SHIPPED | OUTPATIENT
Start: 2018-10-15 | End: 2019-02-11

## 2018-11-08 DIAGNOSIS — K21.9 GASTROESOPHAGEAL REFLUX DISEASE, ESOPHAGITIS PRESENCE NOT SPECIFIED: ICD-10-CM

## 2018-11-08 RX ORDER — OMEPRAZOLE 20 MG/1
CAPSULE, DELAYED RELEASE ORAL
Qty: 90 CAPSULE | Refills: 0 | Status: SHIPPED | OUTPATIENT
Start: 2018-11-08

## 2019-02-03 DIAGNOSIS — F32.A DEPRESSIVE DISORDER: ICD-10-CM

## 2019-02-03 DIAGNOSIS — E78.2 MIXED HYPERLIPIDEMIA: ICD-10-CM

## 2019-02-04 RX ORDER — LOVASTATIN 40 MG/1
TABLET ORAL
Qty: 90 TABLET | Refills: 0 | Status: SHIPPED | OUTPATIENT
Start: 2019-02-04 | End: 2019-06-10

## 2019-02-04 NOTE — TELEPHONE ENCOUNTER
Refill protocol failed, labs out of range.   VS please advise on refill request.    Cholesterol Medications  Protocol Criteria:  · Appointment scheduled in the past 12 months or in the next 3 months  · ALT & LDL on file in the past 12 months  · ALT result <

## 2019-02-11 PROBLEM — M85.80 OSTEOPENIA DETERMINED BY X-RAY: Status: ACTIVE | Noted: 2019-02-11

## 2019-08-13 PROCEDURE — 87086 URINE CULTURE/COLONY COUNT: CPT | Performed by: FAMILY MEDICINE

## 2019-08-13 PROCEDURE — 87186 SC STD MICRODIL/AGAR DIL: CPT | Performed by: FAMILY MEDICINE

## 2019-08-13 PROCEDURE — 87088 URINE BACTERIA CULTURE: CPT | Performed by: FAMILY MEDICINE

## 2019-08-19 PROCEDURE — 87086 URINE CULTURE/COLONY COUNT: CPT | Performed by: FAMILY MEDICINE

## 2019-08-19 PROCEDURE — 81003 URINALYSIS AUTO W/O SCOPE: CPT | Performed by: FAMILY MEDICINE

## 2022-08-26 ENCOUNTER — HOSPITAL ENCOUNTER (OUTPATIENT)
Age: 72
Discharge: HOME | End: 2022-08-26
Payer: MEDICARE

## 2022-08-26 VITALS
WEIGHT: 130 LBS | HEART RATE: 61 BPM | DIASTOLIC BLOOD PRESSURE: 80 MMHG | OXYGEN SATURATION: 100 % | BODY MASS INDEX: 21.66 KG/M2 | TEMPERATURE: 97 F | HEIGHT: 65 IN | RESPIRATION RATE: 16 BRPM | SYSTOLIC BLOOD PRESSURE: 139 MMHG

## 2022-08-26 DIAGNOSIS — N30.00 ACUTE CYSTITIS WITHOUT HEMATURIA: Primary | ICD-10-CM

## 2022-08-26 LAB
BILIRUB UR QL STRIP: NEGATIVE
CLARITY UR: CLEAR
COLOR UR: YELLOW
GLUCOSE UR STRIP-MCNC: NEGATIVE MG/DL
KETONES UR STRIP-MCNC: NEGATIVE MG/DL
NITRITE UR QL STRIP: NEGATIVE
PH UR STRIP: 7.5 [PH]
PROT UR STRIP-MCNC: NEGATIVE MG/DL
SP GR UR STRIP: 1.02
UROBILINOGEN UR STRIP-ACNC: <2 MG/DL

## 2022-08-26 PROCEDURE — 87088 URINE BACTERIA CULTURE: CPT | Performed by: NURSE PRACTITIONER

## 2022-08-26 PROCEDURE — 87186 SC STD MICRODIL/AGAR DIL: CPT | Performed by: NURSE PRACTITIONER

## 2022-08-26 PROCEDURE — 87086 URINE CULTURE/COLONY COUNT: CPT | Performed by: NURSE PRACTITIONER

## 2022-08-26 RX ORDER — NITROFURANTOIN 25; 75 MG/1; MG/1
100 CAPSULE ORAL 2 TIMES DAILY
Qty: 14 CAPSULE | Refills: 0 | Status: SHIPPED | OUTPATIENT
Start: 2022-08-26 | End: 2022-09-02

## 2023-01-02 ENCOUNTER — HOSPITAL ENCOUNTER (OUTPATIENT)
Age: 73
Discharge: HOME | End: 2023-01-02
Payer: MEDICARE

## 2023-01-02 VITALS
TEMPERATURE: 99 F | OXYGEN SATURATION: 98 % | HEART RATE: 111 BPM | DIASTOLIC BLOOD PRESSURE: 90 MMHG | RESPIRATION RATE: 18 BRPM | SYSTOLIC BLOOD PRESSURE: 146 MMHG

## 2023-01-02 DIAGNOSIS — R04.0 EPISTAXIS: Primary | ICD-10-CM

## 2023-01-02 PROCEDURE — 99213 OFFICE O/P EST LOW 20 MIN: CPT | Performed by: NURSE PRACTITIONER

## 2023-01-02 NOTE — DISCHARGE INSTRUCTIONS
To better manage your nosebleed you can: use a humidifier in your room, and refrain from blowing your nose/bending over/heavy lifting. If your nose begins to bleed, seek additional care in the Emergency Department. Follow up with ENT as provided in the next 4 days for re-evaluation.

## 2023-07-13 ENCOUNTER — HOSPITAL ENCOUNTER (OUTPATIENT)
Age: 73
Discharge: HOME | End: 2023-07-13
Payer: MEDICARE

## 2023-07-13 VITALS
TEMPERATURE: 98 F | HEART RATE: 81 BPM | SYSTOLIC BLOOD PRESSURE: 115 MMHG | OXYGEN SATURATION: 100 % | RESPIRATION RATE: 16 BRPM | DIASTOLIC BLOOD PRESSURE: 57 MMHG

## 2023-07-13 DIAGNOSIS — J02.0 ACUTE STREPTOCOCCAL PHARYNGITIS: Primary | ICD-10-CM

## 2023-07-13 DIAGNOSIS — Z20.822 ENCOUNTER FOR LABORATORY TESTING FOR COVID-19 VIRUS: ICD-10-CM

## 2023-07-13 LAB
S PYO AG THROAT QL: POSITIVE
SARS-COV-2 RNA RESP QL NAA+PROBE: NOT DETECTED

## 2023-07-13 PROCEDURE — U0002 COVID-19 LAB TEST NON-CDC: HCPCS | Performed by: PHYSICIAN ASSISTANT

## 2023-07-13 PROCEDURE — 99213 OFFICE O/P EST LOW 20 MIN: CPT | Performed by: PHYSICIAN ASSISTANT

## 2023-07-13 PROCEDURE — 87880 STREP A ASSAY W/OPTIC: CPT | Performed by: PHYSICIAN ASSISTANT

## 2023-07-13 RX ORDER — AMOXICILLIN 250 MG/1
250 CAPSULE ORAL 2 TIMES DAILY
Qty: 20 CAPSULE | Refills: 0 | Status: SHIPPED | OUTPATIENT
Start: 2023-07-13 | End: 2023-07-23

## 2024-09-20 NOTE — TELEPHONE ENCOUNTER
Failed per nursing protocol - please advise on refill request       Cholesterol Medications  Protocol Criteria:  · Appointment scheduled in the past 12 months or in the next 3 months  · ALT & LDL on file in the past 12 months  · ALT result < 80  · LDL res thin male, nad

## (undated) NOTE — MR AVS SNAPSHOT
[facility] \[facility] [place] [facility]\"" - [place] [address] [address] [id number] [phone]               Thank you for choosing us for your health care visit with Carie Nguyen MD.  We are glad to serve you and happy to provide you with this summary of Call [phone] for help. [employer] is NOT to be used for urgent needs. For medical emergencies, dial 911.            Visit [facility] online at  [employer]n

## (undated) NOTE — MR AVS SNAPSHOT
[address] [facility] [address] [address]  [phone]  [phone]               Thank you for choosing us for your health care visit with Tomasz Church PT.   We are glad to serve you and happy to provide you with t Commonly known as:  [address] discussed Today        Last Edited       Therapy Goals 10/27/2016 11:26 AM by Gabriela Macario, PT     Goal Comments    Billy Kathleen / Family Goal    \"to decrease leakage with ADLs\"  LTG Time Frame (14

## (undated) NOTE — MR AVS SNAPSHOT
[address]  [phone]               Thank you for choosing us for your health care visit with Tom Starkey MD.  We are glad to serve you and happy to provide you with this summar Sertraline HCl 50 MG Tabs   Take 1 tablet (50 mg total) by mouth once daily. Commonly known as:  ZOLOFT           Solifenacin Succinate 5 MG Tabs   Take 1 tablet (5 mg total) by mouth daily.    Commonly known as:  VESICARE                Where to Get You Summaries. If you've been to the Emergency Department or your doctor's office, you can view your past visit information in [employer] by going to Visits < Visit Summaries. [employer] questions? Call [phone] for help.   [employer] is NOT to be used for urge

## (undated) NOTE — Clinical Note
Patient Name: Darwin Morton  YOB: 1950          MRN number:  [id number]  Date:  1/19/17  Referring Physician:  Debbi Reagan  Dx: Stress incontinence in female (N39.3)  Urge incontinence (N39.41)  Overactive bladder (N32.81)    Physical 3/5 MMT for 6-7 seconds endurance with improved coordination/muscle recruitment with quick contractions. Patient has met all functional goals and has been instructed to continue independently at home. Patient was instructed in and administered updated HEP. HEP 10/27: bladder inhib tech, PF level 3  11/3: TrA iso in supine  12/15: PF activation with walking   12/22: SB  Pelvic floor  1/12: PF L4a  1/19: L5   Therapeutic Exercise    Manual/Internal ROM/strength/function re-assessment    Internal:  STM, MFR, CT please contact me at Dept: [phone]. Sincerely,  Electronically signed by therapist: Pratima Renteria PT    [de-identified] certification required: Yes  I certify the need for these services furnished under this plan of treatment and while under my care.

## (undated) NOTE — MR AVS SNAPSHOT
[address] [facility] [address] [id number]  [phone]  [phone]               Thank you for choosing us for your health care visit with Vivienne Tracy PT.   We are glad to serve you and happy to provide you with t